# Patient Record
Sex: FEMALE | Race: WHITE | Employment: FULL TIME | URBAN - METROPOLITAN AREA
[De-identification: names, ages, dates, MRNs, and addresses within clinical notes are randomized per-mention and may not be internally consistent; named-entity substitution may affect disease eponyms.]

---

## 2017-01-18 ENCOUNTER — APPOINTMENT (OUTPATIENT)
Dept: GENERAL RADIOLOGY | Age: 42
End: 2017-01-18
Attending: EMERGENCY MEDICINE
Payer: MEDICAID

## 2017-01-18 ENCOUNTER — HOSPITAL ENCOUNTER (EMERGENCY)
Age: 42
Discharge: HOME | End: 2017-01-18
Attending: EMERGENCY MEDICINE
Payer: MEDICAID

## 2017-01-18 VITALS
DIASTOLIC BLOOD PRESSURE: 77 MMHG | BODY MASS INDEX: 26 KG/M2 | TEMPERATURE: 98 F | WEIGHT: 145 LBS | RESPIRATION RATE: 16 BRPM | OXYGEN SATURATION: 100 % | SYSTOLIC BLOOD PRESSURE: 108 MMHG | HEART RATE: 69 BPM

## 2017-01-18 DIAGNOSIS — M25.532 LEFT WRIST PAIN: Primary | ICD-10-CM

## 2017-01-18 PROCEDURE — 99283 EMERGENCY DEPT VISIT LOW MDM: CPT

## 2017-01-18 PROCEDURE — 73110 X-RAY EXAM OF WRIST: CPT

## 2017-01-18 RX ORDER — LEVOTHYROXINE SODIUM 0.1 MG/1
100 TABLET ORAL
COMMUNITY

## 2017-01-18 RX ORDER — IBUPROFEN 600 MG/1
600 TABLET ORAL EVERY 6 HOURS PRN
COMMUNITY

## 2017-01-18 NOTE — ED PROVIDER NOTES
Patient Seen in: [facility] In [place]    History   Patient presents with:  Musculoskeletal Problem    Stated Complaint: 1-2 MONTHS OF LEFT WRIST PAIN AND NUMBNESS    HPI    Patient is a pleasant 52-year-old female, right-hand-dominan and well-nourished. HENT:   Head: Normocephalic and atraumatic. Eyes: Conjunctivae and EOM are normal.   Neck: Normal range of motion. Cardiovascular: Normal rate.     Pulmonary/Chest: Effort normal.   Musculoskeletal:        Left wrist: She exhibits Velcro wrist splint. Patient should ice and elevate her wrist when able. Ibuprofen should be taken as needed, as directed. Patient verbalizes understanding and is comfortable with the plan as recommended.     Patient ambulated freely and was subsequently

## 2017-01-18 NOTE — ED INITIAL ASSESSMENT (HPI)
STATES FOR LAST ONE OR TWO MONTHS SHARP PAIN TO LEFT INNER WRIST UP TO ARM STATES IS SHARP AND SHOOTING NO NECK PAIN NO DIRECT INJURY WORKS IN DELI

## 2017-08-21 ENCOUNTER — APPOINTMENT (OUTPATIENT)
Dept: GENERAL RADIOLOGY | Age: 42
End: 2017-08-21
Attending: EMERGENCY MEDICINE
Payer: MEDICAID

## 2017-08-21 ENCOUNTER — HOSPITAL ENCOUNTER (EMERGENCY)
Age: 42
Discharge: HOME | End: 2017-08-21
Attending: EMERGENCY MEDICINE
Payer: MEDICAID

## 2017-08-21 VITALS
TEMPERATURE: 99 F | OXYGEN SATURATION: 99 % | RESPIRATION RATE: 16 BRPM | WEIGHT: 135 LBS | SYSTOLIC BLOOD PRESSURE: 144 MMHG | HEIGHT: 63 IN | HEART RATE: 76 BPM | DIASTOLIC BLOOD PRESSURE: 72 MMHG | BODY MASS INDEX: 23.92 KG/M2

## 2017-08-21 DIAGNOSIS — R52 BODY ACHES: Primary | ICD-10-CM

## 2017-08-21 DIAGNOSIS — B34.9 VIRAL SYNDROME: ICD-10-CM

## 2017-08-21 LAB
ALBUMIN SERPL-MCNC: 3.7 G/DL (ref 3.5–4.8)
ALP LIVER SERPL-CCNC: 43 U/L (ref 37–98)
ALT SERPL-CCNC: 18 U/L (ref 14–54)
AST SERPL-CCNC: 12 U/L (ref 15–41)
BASOPHILS # BLD AUTO: 0.05 X10(3) UL (ref 0–0.1)
BASOPHILS NFR BLD AUTO: 0.7 %
BILIRUB SERPL-MCNC: 0.3 MG/DL (ref 0.1–2)
BUN BLD-MCNC: 10 MG/DL (ref 8–20)
CALCIUM BLD-MCNC: 8.8 MG/DL (ref 8.3–10.3)
CHLORIDE: 110 MMOL/L (ref 101–111)
CO2: 23 MMOL/L (ref 22–32)
CREAT BLD-MCNC: 0.84 MG/DL (ref 0.55–1.02)
EOSINOPHIL # BLD AUTO: 0.36 X10(3) UL (ref 0–0.3)
EOSINOPHIL NFR BLD AUTO: 5.2 %
ERYTHROCYTE [DISTWIDTH] IN BLOOD BY AUTOMATED COUNT: 15.1 % (ref 11.5–16)
GLUCOSE BLD-MCNC: 111 MG/DL (ref 70–99)
HCT VFR BLD AUTO: 30.8 % (ref 34–50)
HGB BLD-MCNC: 9.7 G/DL (ref 12–16)
IMMATURE GRANULOCYTE COUNT: 0.01 X10(3) UL (ref 0–1)
IMMATURE GRANULOCYTE RATIO %: 0.1 %
LYMPHOCYTES # BLD AUTO: 2.29 X10(3) UL (ref 0.9–4)
LYMPHOCYTES NFR BLD AUTO: 33.3 %
M PROTEIN MFR SERPL ELPH: 7.4 G/DL (ref 6.1–8.3)
MCH RBC QN AUTO: 25.7 PG (ref 27–33.2)
MCHC RBC AUTO-ENTMCNC: 31.5 G/DL (ref 31–37)
MCV RBC AUTO: 81.7 FL (ref 81–100)
MONOCYTES # BLD AUTO: 0.5 X10(3) UL (ref 0.1–0.6)
MONOCYTES NFR BLD AUTO: 7.3 %
NEUTROPHIL ABS PRELIM: 3.67 X10 (3) UL (ref 1.3–6.7)
NEUTROPHILS # BLD AUTO: 3.67 X10(3) UL (ref 1.3–6.7)
NEUTROPHILS NFR BLD AUTO: 53.4 %
PLATELET # BLD AUTO: 340 10(3)UL (ref 150–450)
POTASSIUM SERPL-SCNC: 3.6 MMOL/L (ref 3.6–5.1)
RBC # BLD AUTO: 3.77 X10(6)UL (ref 3.8–5.1)
RED CELL DISTRIBUTION WIDTH-SD: 45 FL (ref 35.1–46.3)
SODIUM SERPL-SCNC: 141 MMOL/L (ref 136–144)
WBC # BLD AUTO: 6.9 X10(3) UL (ref 4–13)

## 2017-08-21 PROCEDURE — 99283 EMERGENCY DEPT VISIT LOW MDM: CPT

## 2017-08-21 PROCEDURE — 71020 XR CHEST PA + LAT CHEST (CPT=71020): CPT | Performed by: EMERGENCY MEDICINE

## 2017-08-21 PROCEDURE — 80053 COMPREHEN METABOLIC PANEL: CPT | Performed by: EMERGENCY MEDICINE

## 2017-08-21 PROCEDURE — 36415 COLL VENOUS BLD VENIPUNCTURE: CPT

## 2017-08-21 PROCEDURE — 84443 ASSAY THYROID STIM HORMONE: CPT | Performed by: EMERGENCY MEDICINE

## 2017-08-21 PROCEDURE — 85025 COMPLETE CBC W/AUTO DIFF WBC: CPT | Performed by: EMERGENCY MEDICINE

## 2017-08-22 ENCOUNTER — TELEPHONE (OUTPATIENT)
Dept: FAMILY MEDICINE CLINIC | Facility: CLINIC | Age: 42
End: 2017-08-22

## 2017-08-22 LAB — TSI SER-ACNC: 3.35 MIU/ML (ref 0.35–5.5)

## 2017-08-22 NOTE — ED NOTES
Pt updated on plan  - pt aware that urine specimen is needed for further dispo and states \"I don't have to go\"   These concerns wore addressed and pt further stated \"I am not going to urinate\"  \"you aren't giving me anything or doing anything for me\"

## 2017-08-22 NOTE — ED PROVIDER NOTES
Patient Seen in: [facility] In [place]    History   Patient presents with: Body ache and/or chills    Stated Complaint: Body ache for two weeks - states she may have the flu.     HPI    81-year-old female presents emergency room with air)    Current:/79   Pulse 70   Temp 98.5 °F (36.9 °C) (Oral)   Resp 16   Ht 160 cm (5' 3\")   Wt 61.2 kg   SpO2 99%   BMI 23.91 kg/m²         Physical Exam    GENERAL: Patient is awake, alert, well-appearing, in no acute distress.   HEENT: Pupils eq ---------                               -----------         ------                     CBC W/ DIFFERENTIAL[[id number]]          Abnormal            Final result                 Please view results for these tests on the individual orders.    Artice Ou

## 2017-10-24 ENCOUNTER — HOSPITAL ENCOUNTER (EMERGENCY)
Age: 42
Discharge: HOME | End: 2017-10-24
Attending: EMERGENCY MEDICINE
Payer: MEDICAID

## 2017-10-24 VITALS
RESPIRATION RATE: 14 BRPM | BODY MASS INDEX: 20.37 KG/M2 | HEART RATE: 80 BPM | WEIGHT: 115 LBS | HEIGHT: 63 IN | SYSTOLIC BLOOD PRESSURE: 128 MMHG | DIASTOLIC BLOOD PRESSURE: 78 MMHG | OXYGEN SATURATION: 99 % | TEMPERATURE: 99 F

## 2017-10-24 DIAGNOSIS — N93.9 VAGINAL BLEEDING: Primary | ICD-10-CM

## 2017-10-24 PROCEDURE — 85025 COMPLETE CBC W/AUTO DIFF WBC: CPT | Performed by: EMERGENCY MEDICINE

## 2017-10-24 PROCEDURE — 99284 EMERGENCY DEPT VISIT MOD MDM: CPT

## 2017-10-24 PROCEDURE — 81025 URINE PREGNANCY TEST: CPT

## 2017-10-24 PROCEDURE — 36415 COLL VENOUS BLD VENIPUNCTURE: CPT

## 2017-10-25 NOTE — ED PROVIDER NOTES
Patient Seen in: [facility] In [place]    History   Patient presents with:  Eval-G (gynecologic)    Stated Complaint: VAG BLEEDING FOR PAST 3 DAYS    HPI    Patient is a 62-year-old female comes emergency room for evaluation of vagina BMI 20.37 kg/m²         Physical Exam    GENERAL: No acute distress, well appearing and non-toxic, Alert and oriented X 3   HEENT: Normocephalic, atraumatic. Moist mucous membranes.   Pupils equal round reactive to light accommodation, extraocular motion i significantly where I think she needs hormone burst at this time. She will be discharged in stable condition. Recommend gynecology follow-up. Patient was screened and evaluated during this visit.    As a treating physician attending to the patient, I d

## 2024-07-01 ENCOUNTER — HOSPITAL ENCOUNTER (EMERGENCY)
Age: 49
Discharge: HOME | End: 2024-07-01
Attending: EMERGENCY MEDICINE
Payer: MEDICAID

## 2024-07-01 ENCOUNTER — APPOINTMENT (OUTPATIENT)
Dept: GENERAL RADIOLOGY | Age: 49
End: 2024-07-01
Attending: EMERGENCY MEDICINE
Payer: MEDICAID

## 2024-07-01 VITALS
SYSTOLIC BLOOD PRESSURE: 124 MMHG | BODY MASS INDEX: 23 KG/M2 | HEART RATE: 66 BPM | TEMPERATURE: 99 F | DIASTOLIC BLOOD PRESSURE: 88 MMHG | RESPIRATION RATE: 18 BRPM | OXYGEN SATURATION: 100 % | WEIGHT: 130 LBS

## 2024-07-01 DIAGNOSIS — J02.9 VIRAL PHARYNGITIS: ICD-10-CM

## 2024-07-01 DIAGNOSIS — G43.909 MIGRAINE WITHOUT STATUS MIGRAINOSUS, NOT INTRACTABLE, UNSPECIFIED MIGRAINE TYPE: Primary | ICD-10-CM

## 2024-07-01 LAB
ALBUMIN SERPL-MCNC: 4.4 G/DL (ref 3.4–5)
ALBUMIN/GLOB SERPL: 1.1 {RATIO} (ref 1–2)
ALP LIVER SERPL-CCNC: 51 U/L
ALT SERPL-CCNC: 25 U/L
ANION GAP SERPL CALC-SCNC: 6 MMOL/L (ref 0–18)
AST SERPL-CCNC: 28 U/L (ref 15–37)
BASOPHILS # BLD AUTO: 0.04 X10(3) UL (ref 0–0.2)
BASOPHILS NFR BLD AUTO: 0.5 %
BILIRUB SERPL-MCNC: 0.7 MG/DL (ref 0.1–2)
BUN BLD-MCNC: 13 MG/DL (ref 9–23)
CALCIUM BLD-MCNC: 9.9 MG/DL (ref 8.5–10.1)
CHLORIDE SERPL-SCNC: 105 MMOL/L (ref 98–112)
CO2 SERPL-SCNC: 25 MMOL/L (ref 21–32)
CREAT BLD-MCNC: 0.61 MG/DL
EGFRCR SERPLBLD CKD-EPI 2021: 111 ML/MIN/1.73M2 (ref 60–?)
EOSINOPHIL # BLD AUTO: 0.04 X10(3) UL (ref 0–0.7)
EOSINOPHIL NFR BLD AUTO: 0.5 %
ERYTHROCYTE [DISTWIDTH] IN BLOOD BY AUTOMATED COUNT: 12.8 %
GLOBULIN PLAS-MCNC: 4 G/DL (ref 2.8–4.4)
GLUCOSE BLD-MCNC: 102 MG/DL (ref 70–99)
HCT VFR BLD AUTO: 37.4 %
HGB BLD-MCNC: 13.2 G/DL
IMM GRANULOCYTES # BLD AUTO: 0.03 X10(3) UL (ref 0–1)
IMM GRANULOCYTES NFR BLD: 0.3 %
LYMPHOCYTES # BLD AUTO: 2.43 X10(3) UL (ref 1–4)
LYMPHOCYTES NFR BLD AUTO: 27.9 %
MCH RBC QN AUTO: 31.7 PG (ref 26–34)
MCHC RBC AUTO-ENTMCNC: 35.3 G/DL (ref 31–37)
MCV RBC AUTO: 89.9 FL
MONOCYTES # BLD AUTO: 0.58 X10(3) UL (ref 0.1–1)
MONOCYTES NFR BLD AUTO: 6.7 %
NEUTROPHILS # BLD AUTO: 5.58 X10 (3) UL (ref 1.5–7.7)
NEUTROPHILS # BLD AUTO: 5.58 X10(3) UL (ref 1.5–7.7)
NEUTROPHILS NFR BLD AUTO: 64.1 %
OSMOLALITY SERPL CALC.SUM OF ELEC: 282 MOSM/KG (ref 275–295)
PLATELET # BLD AUTO: 225 10(3)UL (ref 150–450)
POCT INFLUENZA A: NEGATIVE
POCT INFLUENZA B: NEGATIVE
POTASSIUM SERPL-SCNC: 3.5 MMOL/L (ref 3.5–5.1)
PROT SERPL-MCNC: 8.4 G/DL (ref 6.4–8.2)
RBC # BLD AUTO: 4.16 X10(6)UL
SARS-COV-2 RNA RESP QL NAA+PROBE: NOT DETECTED
SODIUM SERPL-SCNC: 136 MMOL/L (ref 136–145)
TSI SER-ACNC: 2.12 MIU/ML (ref 0.36–3.74)
WBC # BLD AUTO: 8.7 X10(3) UL (ref 4–11)

## 2024-07-01 PROCEDURE — 96375 TX/PRO/DX INJ NEW DRUG ADDON: CPT

## 2024-07-01 PROCEDURE — 84443 ASSAY THYROID STIM HORMONE: CPT | Performed by: EMERGENCY MEDICINE

## 2024-07-01 PROCEDURE — 99285 EMERGENCY DEPT VISIT HI MDM: CPT

## 2024-07-01 PROCEDURE — 85025 COMPLETE CBC W/AUTO DIFF WBC: CPT | Performed by: EMERGENCY MEDICINE

## 2024-07-01 PROCEDURE — 80053 COMPREHEN METABOLIC PANEL: CPT | Performed by: EMERGENCY MEDICINE

## 2024-07-01 PROCEDURE — 96374 THER/PROPH/DIAG INJ IV PUSH: CPT

## 2024-07-01 PROCEDURE — 87430 STREP A AG IA: CPT | Performed by: EMERGENCY MEDICINE

## 2024-07-01 PROCEDURE — 70360 X-RAY EXAM OF NECK: CPT | Performed by: EMERGENCY MEDICINE

## 2024-07-01 PROCEDURE — 87502 INFLUENZA DNA AMP PROBE: CPT | Performed by: EMERGENCY MEDICINE

## 2024-07-01 PROCEDURE — 99284 EMERGENCY DEPT VISIT MOD MDM: CPT

## 2024-07-01 RX ORDER — METOCLOPRAMIDE HYDROCHLORIDE 5 MG/ML
10 INJECTION INTRAMUSCULAR; INTRAVENOUS ONCE
Status: COMPLETED | OUTPATIENT
Start: 2024-07-01 | End: 2024-07-01

## 2024-07-01 RX ORDER — BUTALBITAL, ACETAMINOPHEN AND CAFFEINE 50; 325; 40 MG/1; MG/1; MG/1
1-2 TABLET ORAL EVERY 6 HOURS PRN
Qty: 10 TABLET | Refills: 0 | Status: SHIPPED | OUTPATIENT
Start: 2024-07-01 | End: 2024-07-06

## 2024-07-01 RX ORDER — KETOROLAC TROMETHAMINE 15 MG/ML
15 INJECTION, SOLUTION INTRAMUSCULAR; INTRAVENOUS ONCE
Status: COMPLETED | OUTPATIENT
Start: 2024-07-01 | End: 2024-07-01

## 2024-07-01 RX ORDER — DIPHENHYDRAMINE HYDROCHLORIDE 50 MG/ML
25 INJECTION INTRAMUSCULAR; INTRAVENOUS ONCE
Status: COMPLETED | OUTPATIENT
Start: 2024-07-01 | End: 2024-07-01

## 2024-07-01 NOTE — ED PROVIDER NOTES
Patient Seen in: [facility] In [place]      History     Chief Complaint   Patient presents with    Throat Problem     Stated Complaint: c/o throat tightness since yesterday    Subjective:   HPI    48-year-old female complaining of throat pain the patient has pain on the anterior lateral aspect of her throat bilaterally started yesterday feels like like is difficult to breathe at times.  States at the same time she developed a headache that came on gradually there is no thunderclap onset generalized headache she has some light sensitivity.  She had slight diarrhea she was feeling somewhat anxious she googled her symptoms and thought she might have a thyroid storm.  She has a history of hypothyroidism and takes levothyroxine has not changed her dosage recently.  She does have a history of headaches and she has had headaches similar to this in the past.  No blurred vision no fever or neck  stiffness.  The area of her pain in her neck is more in the upper anterior and lateral aspect not directly over the thyroid area.    Objective:   Past Medical History:    Addiction to drug (HCC)    opiod    Chronic kidney disease    Pap smear for cervical cancer screening    negative    Thyroid disease              Past Surgical History:   Procedure Laterality Date      X1    Total abdom hysterectomy                  Social History     Socioeconomic History    Marital status:    Tobacco Use    Smoking status: Never    Smokeless tobacco: Never   Substance and Sexual Activity    Drug use: Never              Review of Systems    Positive for stated Chief Complaint: Throat Problem    Other systems are as noted in HPI.  Constitutional and vital signs reviewed.      All other systems reviewed and negative except as noted above.    Physical Exam     ED Triage Vitals [24 1306]   BP (!) 178/106   Pulse 78   Resp 20   Temp 98.5 °F (36.9 °C)   Temp src Temporal   SpO2 98 %   O2 Device None (Room air)        Current Vitals:   Vital Signs  BP: 124/88  Pulse: 66  Resp: 18  Temp: 98.5 °F (36.9 °C)  Temp src: Temporal    Oxygen Therapy  SpO2: 100 %  O2 Device: None (Room air)            Physical Exam    Patient is alert and orient x 3 no acute distress HEENT exam pupils are equal round react to light extract [place] oropharynx is clear tympanic memories normal the neck is supple is no nuchal rigidity there is no tenderness or mass over the thyroid there is some mild tenderness in the submandibular regions bilaterally.  But no swelling.  The lungs are clear cardiovascular exam shows regular rate and rhythm without murmurs  Abdomen is soft and nontender.  Skin there is no rash.  Extremities there is no clubbing cyanosis or edema.  Mental status alert and oriented ×3  Cranial nerves II through XII within normal limits  Motor function is intact in all 4 extremities  Sensation is intact in all 4 extremities  Cerebellar finger-nose and heel-to-shin are normal  Deep tendon reflexes are normal    ED Course     Labs Reviewed   COMP METABOLIC PANEL (14) - Abnormal; Notable for the following components:       Result Value    Glucose 102 (*)     Total Protein 8.4 (*)     All other components within normal limits   TSH W REFLEX TO FREE T4 - Normal   RAPID STREP A SCREEN (LC) - Normal    Narrative:     A confirmatory culture is recommended if clinically indicated.   POCT FLU TEST - Normal    Narrative:     This assay is a rapid molecular in vitro test utilizing nucleic acid amplification of influenza A and B viral RNA.   RAPID SARS-COV-2 BY PCR - Normal   CBC WITH DIFFERENTIAL WITH PLATELET    Narrative:     The following orders were created for panel order CBC With Differential With Platelet.  Procedure                               Abnormality         Status                     ---------                               -----------         ------                     CBC W/ DIFFERENTIAL[[id number]]                              Final  result                 Please view results for these tests on the individual orders.   RAINBOW DRAW LAVENDER   RAINBOW DRAW LIGHT GREEN   CBC W/ DIFFERENTIAL             Abnormal Labs Reviewed   COMP METABOLIC PANEL (14) - Abnormal; Notable for the following components:       Result Value    Glucose 102 (*)     Total Protein 8.4 (*)     All other components within normal limits   Labs unremarkable  XR SOFT TISSUE NECK (CPT=70360)    Result Date: 7/1/2024  CONCLUSION:  Widely patent airway. No radiopaque foreign body.   LOCATION:  [id number]    Dictated by (CST): Wiley Barbour MD on 7/01/2024 at 2:03 PM     Finalized by (CST): Wiley Barbour MD on 7/01/2024 at 2:04 PM      Images independently reviewed there is no evidence of airway obstruction         MDM      Initial differential diagnoses considered but not limited to includes epiglottitis viral pharyngitis thyroiditis migraine tension headache    Patient felt much better after migraine medications I do not think she has anything to suggest thyroid storm advised her to follow-up with her doctor next few days return.                               Medical Decision Making      Disposition and Plan     Clinical Impression:  1. Migraine without status migrainosus, not intractable, unspecified migraine type    2. Viral pharyngitis         Disposition:  Discharge  7/1/2024  3:37 pm    Follow-up:  Geneva Ramos MD  [address] [facility] [address]  [phone]    Follow up in 1 week(s)            Medications Prescribed:  Discharge Medication List as of 7/1/2024  3:39 PM

## 2024-07-01 NOTE — ED INITIAL ASSESSMENT (HPI)
Pt states since last night pt co migraine ha and since last night feels like throat  Is swollen, \"thinks she is having a throid storm\"

## 2024-07-01 NOTE — DISCHARGE INSTRUCTIONS
Tylenol or Advil as needed.  Take the Fioricet for more severe headache this will make you drowsy do not drink or drive while taking it.  Follow-up with your doctor next 2 days return if worse.

## (undated) NOTE — ED AVS SNAPSHOT
[facility] in [address] [place] [address]    Phone:  [phone]    Fax:  [phone]           Jodi Franklin   MRN: [id number]    Department:  [facility] in [place]   Date of Visit: from our patient liason soon after your visit. Also, some patients receive a detailed feedback survey mailed to them a week after the visit. If you receive this, we would really appreciate it if you could take the time to complete it. Thank you!       [facility] ([address]) [facility] [address] [facility]. ([address]) [facility] [address] ([address]) [address] [facility]  [facility] [address] left wrist pain for 2 weeks. Patient states pain now shoots up into shoulder and is having pain to bilateral   sides of wrist along with  medial pain on palmar surface. Patient states she use to be able to crack wrist and now wrist won't crack.

## (undated) NOTE — ED AVS SNAPSHOT
[facility] in [facility]    Phone:  [phone]    Fax:  [phone]           Beatrice Mejia   MRN: [id number]    Department:  [facility] in [place]   Date of Visit: IF THERE IS ANY CHANGE OR WORSENING OF YOUR CONDITION, CALL YOUR PRIMARY CARE PHYSICIAN AT ONCE OR RETURN IMMEDIATELY TO THE EMERGENCY DEPARTMENT.     If you have been prescribed any medication(s), please fill your prescription right away and begin taking t

## (undated) NOTE — ED AVS SNAPSHOT
Leah Kelly   MRN: [id number]    Department:  [facility] in [place]   Date of Visit:  10/24/2017           Disclosure     Insurance plans vary and the physician(s) referred by the ER may not be covered by your plan.  Please contac If you have been prescribed any medication(s), please fill your prescription right away and begin taking the medication(s) as directed    If the emergency physician has read X-rays, these will be re-interpreted by a radiologist.  If there is a significant

## (undated) NOTE — ED AVS SNAPSHOT
Harley Norton   MRN: [id number]    Department:  [facility] in [place]   Date of Visit:  8/21/2017           Disclosure     Insurance plans vary and the physician(s) referred by the ER may not be covered by your plan.  Please contact If you have been prescribed any medication(s), please fill your prescription right away and begin taking the medication(s) as directed    If the emergency physician has read X-rays, these will be re-interpreted by a radiologist.  If there is a significant

## (undated) NOTE — LETTER
Date & Time: 7/1/2024, 3:45 PM  Patient: Preeti Baldwin  Encounter Provider(s):    Alejandro Mccabe MD       To Whom It May Concern:    Preeti Baldwin was seen and treated in our department on 7/1/2024. She should not return to work until 7/2/24 .    If you have any questions or concerns, please do not hesitate to call.        _____________________________  Physician/APC Signature